# Patient Record
Sex: FEMALE | Race: WHITE | Employment: OTHER | URBAN - NONMETROPOLITAN AREA
[De-identification: names, ages, dates, MRNs, and addresses within clinical notes are randomized per-mention and may not be internally consistent; named-entity substitution may affect disease eponyms.]

---

## 2017-01-04 ENCOUNTER — TELEPHONE (OUTPATIENT)
Dept: FAMILY MEDICINE | Facility: OTHER | Age: 67
End: 2017-01-04

## 2018-06-04 ENCOUNTER — MYC MEDICAL ADVICE (OUTPATIENT)
Dept: INTERNAL MEDICINE | Facility: CLINIC | Age: 68
End: 2018-06-04

## 2018-06-04 DIAGNOSIS — Z12.11 SCREENING FOR COLON CANCER: Primary | ICD-10-CM

## 2018-06-04 DIAGNOSIS — M85.80 OSTEOPENIA: ICD-10-CM

## 2018-06-06 ENCOUNTER — TELEPHONE (OUTPATIENT)
Dept: INTERNAL MEDICINE | Facility: CLINIC | Age: 68
End: 2018-06-06

## 2018-06-06 NOTE — TELEPHONE ENCOUNTER
Left message for patient to return call to schedule EGD/colonoscopy. If Estefani or Chelle are not available, please transfer to same day surgery

## 2018-06-13 NOTE — TELEPHONE ENCOUNTER
Left message for patient to return call to schedule colonoscopy or EGD. If Chelle or Estefani are unavailable, please transfer to the surgery center.       Letter sent

## 2018-06-20 ENCOUNTER — OFFICE VISIT (OUTPATIENT)
Dept: INTERNAL MEDICINE | Facility: CLINIC | Age: 68
End: 2018-06-20
Payer: COMMERCIAL

## 2018-06-20 VITALS
WEIGHT: 137 LBS | RESPIRATION RATE: 16 BRPM | SYSTOLIC BLOOD PRESSURE: 116 MMHG | OXYGEN SATURATION: 100 % | HEIGHT: 64 IN | TEMPERATURE: 97.7 F | DIASTOLIC BLOOD PRESSURE: 74 MMHG | BODY MASS INDEX: 23.39 KG/M2 | HEART RATE: 94 BPM

## 2018-06-20 DIAGNOSIS — R09.82 POST-NASAL DRIP: ICD-10-CM

## 2018-06-20 DIAGNOSIS — M81.0 AGE-RELATED OSTEOPOROSIS WITHOUT CURRENT PATHOLOGICAL FRACTURE: ICD-10-CM

## 2018-06-20 DIAGNOSIS — Z12.11 SPECIAL SCREENING FOR MALIGNANT NEOPLASM OF COLON: ICD-10-CM

## 2018-06-20 DIAGNOSIS — E03.9 HYPOTHYROIDISM, UNSPECIFIED TYPE: Primary | ICD-10-CM

## 2018-06-20 LAB — TSH SERPL DL<=0.005 MIU/L-ACNC: 0.81 MU/L (ref 0.4–4)

## 2018-06-20 PROCEDURE — 99214 OFFICE O/P EST MOD 30 MIN: CPT | Performed by: INTERNAL MEDICINE

## 2018-06-20 PROCEDURE — 84443 ASSAY THYROID STIM HORMONE: CPT | Performed by: INTERNAL MEDICINE

## 2018-06-20 PROCEDURE — 36415 COLL VENOUS BLD VENIPUNCTURE: CPT | Performed by: INTERNAL MEDICINE

## 2018-06-20 ASSESSMENT — PAIN SCALES - GENERAL: PAINLEVEL: NO PAIN (0)

## 2018-06-20 NOTE — PROGRESS NOTES
"  SUBJECTIVE:   Marina Lo is a 67 year old female who presents to clinic today for the following health issues:    Chief Complaint   Patient presents with     [facility] Care     Thyroid  Osteopenia     Sinus Problem      Lives in [facility] and is here now, mother is a patient of mine.     Hypothyroidism and been on synthroid.     Osteoporosis and has been on fosamax for years and quit it 6 weeks ago, maybe feels better.     She is ok with colonoscopy.    Some post nasal drip, bad breath, sinus congestion on and off.      Past Medical History:   Diagnosis Date     Osteoporosis     was treated with fosamax.     Unspecified hypothyroidism 1983     Current Outpatient Prescriptions   Medication     CALCIUM 600 + D 600-200 MG-UNIT OR TABS     levothyroxine (SYNTHROID) 50 MCG tablet     MULTIVITAMIN TABS   OR     No current facility-administered medications for this visit.      Social History   Substance Use Topics     Smoking status: Never Smoker     Smokeless tobacco: Never Used     Alcohol use 1.0 oz/week      Comment: occasional     Review of Systems  Constitutional-No fevers, chills, or weight changes..  ENT-No earpain, sore throat, voice changes or rhinitis.  Cardiac-No chest pain or palpitations.  Respiratory-No cough, sob, or hemoptysis.  GI-some lower abd irritation, she is gluten free for many years..  Musculoskeletal-No muscles aches or joint pains.    Physical Exam  /74  Pulse 94  Temp 97.7  F (36.5  C) (Temporal)  Resp 16  Ht 5' 4\" (1.626 m)  Wt 137 lb (62.1 kg)  SpO2 100%  BMI 23.52 kg/m2  General Appearance-healthy, alert, no distress  ENT-ENT exam normal, no neck nodes or sinus tenderness  Cardiac-regular rate and rhythm  with normal S1, S2 ; no murmur, rub or gallops  Lungs-clear to auscultation  GI-Soft, nontender.  Normal bowel sounds.  No hepatosplenomegaly or abnormal masses  Extremities-no peripheral edema, peripheral pulses normal  Musculoskeletal-Back with normal ROM. Motor " strength intact.    ASSESSMENT:  This is a 67-year-old woman who is actually living in [facility] where her  is a professor.  She comes back twice a year.  Overall is feeling pretty good.  She does have a international insurance so has to check on some prices.    Need for a mammogram she defers and wants to wait to check on the price.    Need for colonoscopy she will look into this and will probably do it later on but she is to check the cost.    Osteoporosis-the patient has been on Fosamax.  I would recommend a DEXA scan to see how her bones are doing but she can also be on a 5 year holiday and she is already been on Fosamax for over 5 years.    Hypothyroidism-we will check her TSH and continue her on Synthroid.  She is on her on a lower dose recently.    Her lipids have been very good with a high HDL so no reason to check today.    Sinus congestion without any sinus pain, appears to be more chronic allergies or postnasal drip, will have her try over-the-counter Flonase nasal spray.    Electronically signed by Eddy Pendleton MD

## 2018-06-20 NOTE — TELEPHONE ENCOUNTER
Date of colonoscopy/EGD: 6/27/18  Surgeon: Dr. Pineda  Prep:Miralax  Packet:Colonoscopy instructions mailed to patient's home address. also advised that she can print from [employer] ([employer]).    Date: 6/20/2018      Surgery Scheduler

## 2018-06-20 NOTE — MR AVS SNAPSHOT
"              After Visit Summary   6/20/2018    Marina Lo    MRN: [id number]           Patient Information     Date Of Birth          1950        Visit Information        Provider Department      6/20/2018 7:00 AM Eddy Pendleton MD [facility]         Follow-ups after your visit        Who to contact     If you have questions or need follow up information about today's clinic visit or your schedule please contact [facility] directly at [phone].  Normal or non-critical lab and imaging results will be communicated to you by MyChart, letter or phone within 4 business days after the clinic has received the results. If you do not hear from us within 7 days, please contact the clinic through [employer] or phone. If you have a critical or abnormal lab result, we will notify you by phone as soon as possible.  Submit refill requests through [employer] or call your pharmacy and they will forward the refill request to us. Please allow 3 business days for your refill to be completed.          Additional Information About Your Visit        MyChart Information     [employer] gives you secure access to your electronic health record. If you see a primary care provider, you can also send messages to your care team and make appointments. If you have questions, please call your primary care clinic.  If you do not have a primary care provider, please call [phone] and they will assist you.        Care EveryWhere ID     This is your Care EveryWhere ID. This could be used by other organizations to access your [place] medical records  [id number]        Your Vitals Were     Pulse Temperature Respirations Height Pulse Oximetry BMI (Body Mass Index)    94 97.7  F (36.5  C) (Temporal) 16 5' 4\" (1.626 m) 100% 23.52 kg/m2       Blood Pressure from Last 3 Encounters:   06/20/18 116/74   07/27/16 102/66   08/05/13 106/62    Weight from Last 3 Encounters:   06/20/18 137 lb (62.1 kg)   07/27/16 140 lb " 1.6 oz (63.5 kg)   08/05/13 125 lb 4.8 oz (56.8 kg)              Today, you had the following     No orders found for display       Primary Care Provider Office Phone # Fax #    Eddy Pendleton [phone] [address] [address]        Equal Access to Services     JOANN WEBER : Hadii aad ku hadasho Soomaali, waaxda luqadaha, qaybta kaalmada adeegyada, waxay idiin hayaan adeeg khharindersh laflorencion . So [facility] [phone].    ATENCIÓN: Si habla español, tiene a hanna disposición servicios gratuitos de asistencia lingüística. Llame al [phone].    We comply with applicable federal civil rights laws and [place] laws. We do not discriminate on the basis of race, color, national origin, age, disability, sex, sexual orientation, or gender identity.            Thank you!     Thank you for choosing [facility]  for your care. Our goal is always to provide you with excellent care. Hearing back from our patients is one way we can continue to improve our services. Please take a few minutes to complete the written survey that you may receive in the mail after your visit with us. Thank you!             Your Updated Medication List - Protect others around you: Learn how to safely use, store and throw away your medicines at www.disposemymeds.org.          This list is accurate as of 6/20/18  7:05 AM.  Always use your most recent med list.                   Brand Name Dispense Instructions for use Diagnosis    CALCIUM 600 + D 600-200 MG-UNIT Tabs     200    3 daily        levothyroxine 50 MCG tablet    SYNTHROID    280 tablet    Take 1 tablet (50 mcg) by mouth every morning    Hypothyroidism, unspecified type       MULTIVITAMIN TABS   OR      1 tab daily

## 2018-06-21 ENCOUNTER — HOSPITAL ENCOUNTER (OUTPATIENT)
Dept: BONE DENSITY | Facility: CLINIC | Age: 68
Discharge: HOME | End: 2018-06-21
Attending: INTERNAL MEDICINE | Admitting: INTERNAL MEDICINE
Payer: COMMERCIAL

## 2018-06-21 PROCEDURE — 77080 DXA BONE DENSITY AXIAL: CPT

## 2018-06-27 ENCOUNTER — SURGERY (OUTPATIENT)
Age: 68
End: 2018-06-27

## 2018-06-27 ENCOUNTER — HOSPITAL ENCOUNTER (OUTPATIENT)
Facility: CLINIC | Age: 68
Discharge: HOME | End: 2018-06-27
Attending: SURGERY | Admitting: SURGERY
Payer: COMMERCIAL

## 2018-06-27 VITALS
TEMPERATURE: 97.9 F | SYSTOLIC BLOOD PRESSURE: 138 MMHG | RESPIRATION RATE: 12 BRPM | OXYGEN SATURATION: 98 % | DIASTOLIC BLOOD PRESSURE: 88 MMHG

## 2018-06-27 LAB — COLONOSCOPY: NORMAL

## 2018-06-27 PROCEDURE — G0500 MOD SEDAT ENDO SERVICE >5YRS: HCPCS | Performed by: SURGERY

## 2018-06-27 PROCEDURE — 45378 DIAGNOSTIC COLONOSCOPY: CPT | Performed by: SURGERY

## 2018-06-27 PROCEDURE — G0121 COLON CA SCRN NOT HI RSK IND: HCPCS | Performed by: SURGERY

## 2018-06-27 PROCEDURE — 25000128 H RX IP 250 OP 636: Performed by: SURGERY

## 2018-06-27 PROCEDURE — 25000132 ZZH RX MED GY IP 250 OP 250 PS 637: Performed by: SURGERY

## 2018-06-27 PROCEDURE — G0500 MOD SEDAT ENDO SERVICE >5YRS: HCPCS

## 2018-06-27 PROCEDURE — 40000297 ZZH STATISTIC ENDO RECOVERY CLASS 1:2 EACH ADDL HOUR: Performed by: SURGERY

## 2018-06-27 PROCEDURE — 40000296 ZZH STATISTIC ENDO RECOVERY CLASS 1:2 FIRST HOUR: Performed by: SURGERY

## 2018-06-27 RX ORDER — FENTANYL CITRATE 50 UG/ML
INJECTION, SOLUTION INTRAMUSCULAR; INTRAVENOUS PRN
Status: DISCONTINUED | OUTPATIENT
Start: 2018-06-27 | End: 2018-06-27 | Stop reason: HOSPADM

## 2018-06-27 RX ORDER — LIDOCAINE 40 MG/G
CREAM TOPICAL
Status: DISCONTINUED | OUTPATIENT
Start: 2018-06-27 | End: 2018-06-27 | Stop reason: HOSPADM

## 2018-06-27 RX ORDER — ONDANSETRON 2 MG/ML
4 INJECTION INTRAMUSCULAR; INTRAVENOUS
Status: DISCONTINUED | OUTPATIENT
Start: 2018-06-27 | End: 2018-06-27 | Stop reason: HOSPADM

## 2018-06-27 RX ADMIN — MIDAZOLAM 2 MG: 1 INJECTION INTRAMUSCULAR; INTRAVENOUS at 07:39

## 2018-06-27 RX ADMIN — FENTANYL CITRATE 25 MCG: 50 INJECTION, SOLUTION INTRAMUSCULAR; INTRAVENOUS at 07:58

## 2018-06-27 RX ADMIN — MIDAZOLAM 1 MG: 1 INJECTION INTRAMUSCULAR; INTRAVENOUS at 07:44

## 2018-06-27 RX ADMIN — FENTANYL CITRATE 25 MCG: 50 INJECTION, SOLUTION INTRAMUSCULAR; INTRAVENOUS at 07:44

## 2018-06-27 RX ADMIN — SIMETHICONE 250 ML: 20 SUSPENSION/ DROPS ORAL at 07:46

## 2018-06-27 RX ADMIN — FENTANYL CITRATE 100 MCG: 50 INJECTION, SOLUTION INTRAMUSCULAR; INTRAVENOUS at 07:39

## 2018-06-27 NOTE — IP AVS SNAPSHOT
MRN:[id number]                      After Visit Summary   6/27/2018    Marina Lo    MRN: [id number]           Thank you!     Thank you for choosing [place] for your care. Our goal is always to provide you with excellent care. Hearing back from our patients is one way we can continue to improve our services. Please take a few minutes to complete the written survey that you may receive in the mail after you visit with us. Thank you!        Patient Information     Date Of Birth          1950        About your hospital stay     You were admitted on:  June 27, 2018 You last received care in the:  [facility]    You were discharged on:  June 27, 2018       Who to Call     For medical emergencies, please call 911.  For non-urgent questions about your medical care, please call your primary care provider or clinic, [phone]  For questions related to your surgery, please call your surgery clinic        Attending Provider     Provider Specialty    Rishi Ayers,  Surgery       Primary Care Provider Office Phone # Fax #    Eddy Pendleton [phone]      Further instructions from your care team         [facility]    Home Care Following Endoscopy          Activity:    You have just undergone an endoscopic procedure usually performed with conscious sedation.  Do not work or operate machinery (including a car) for at least 12 hours.      I encourage you to walk and attempt to pass this air as soon as possible.    Diet:    Return to the diet you were on before your procedure but eat lightly for the first 12-24 hours.    Drink plenty of water.    Resume any regular medications unless otherwise advised by your physician.  Please begin any new medication prescribed as a result of your procedure as directed by your physician.     If you had any biopsy or polyp removed please refrain from aspirin or aspirin products for 2 days.  If on  Coumadin please restart as instructed by your physician.   Pain:    You may take Tylenol as needed for pain.  Expected Recovery:    You can expect some mild abdominal fullness and/or discomfort due to the air used to inflate your intestinal tract. It is also normal to have a mild sore throat after upper endoscopy.    Call Your Physician if You Have    After Colonoscopy:  o Worsening persisting abdominal pain which is worse with activity.  o Fevers (>101 degrees F), chills or shakes.  o Passage of continued blood with bowel movements.   Any questions or concerns about your recovery, please call [phone] or after hours [phone] Nurse Advice Line.    Follow-up Care:    You should receive a call or letter with your results within 1 week. Please call if you have not received a notification of your results.  If asked to return to clinic please make an appointment 1 week after your procedure.  Call [phone].     Same-Day Surgery   Adult Discharge Orders & Instructions     For 24 hours after surgery    1. Get plenty of rest.  A responsible adult must stay with you for at least 24 hours after you leave the hospital.   2. Do not drive or use heavy equipment.  If you have weakness or tingling, don't drive or use heavy equipment until this feeling goes away.  3. Do not drink alcohol.  4. Avoid strenuous or risky activities.  Ask for help when climbing stairs.   5. You may feel lightheaded.  IF so, sit for a few minutes before standing.  Have someone help you get up.   6. You may have a slight fever. Call the doctor if your fever is over 100 F (37.7 C) (taken under the tongue) or lasts longer than 24 hours.  7. You may have a dry mouth, a sore throat, muscle aches or trouble sleeping.  These should go away after 24 hours.  8. Do not make important or legal decisions.  Based on the surgery/procedure that you had today, we do not anticipate that you will have any problems.  However, given the various responses that  patients can have to the surgical experience, we want to ensure that you have information available to manage pain or nausea and what to do if you observe bleeding or you develop any signs and symptoms of infection:  Methods to control pain include:  Prescription pain medication or over the counter medications as prescribed or suggested by your physician.  In addition, ice packs and periods of rest are often helpful.  If your pain is not managed with the above methods, contact your physician.  Methods to control nausea include:  Anti-nausea medication approved by your physician.  Drink clear liquids such as apple juice, ginger ale, broth or 7-Up. Be sure to drink enough fluids.  Move to a regular diet as you feel able.  Rest may also help.  Bleeding:  It's not uncommon to see a little blood staining on the dressing, about the size of a quarter in the first 24 hours; if you see this, there is no reason to be alarmed.  However, should this continue to increase in size, apply pressure if able, ant notify your physician.  Infection:  We do not anticipate that you will acquire an infection, but if you should experience any of the following symptoms:  redness, swelling, heat, increasing pain or abnormal drainage at your surgery site, fever or chills, please notify your physician.    Call your doctor for any of the followin.  Signs of infection (fever, growing tenderness at the surgery site, a large amount of drainage or bleeding, severe pain, foul-smelling drainage, redness, swelling).    2. It has been over 8 to 10 hours since surgery and you are still not able to urinate (pass water).    Nurse advice line: [phone]        Pending Results     No orders found from 2018 to 2018.            Admission Information     Date & Time Provider Department Dept. Phone    2018 Rishi Ayers,  [facility] Endoscopy [phone]      Your Vitals Were     Blood Pressure Temperature  Respirations Pulse Oximetry          138/88 97.9  F (36.6  C) (Oral) 12 98%        [employer] Information     [employer] gives you secure access to your electronic health record. If you see a primary care provider, you can also send messages to your care team and make appointments. If you have questions, please call your primary care clinic.  If you do not have a primary care provider, please call [phone] and they will assist you.        Care EveryWhere ID     This is your Care EveryWhere ID. This could be used by other organizations to access your [place] medical records  [id number]        Equal Access to Services     [facility]: Anthony Morfin, mary marie, mel rangel, jessie mauro . So [facility] [phone].    ATENCIÓN: Si habla español, tiene a hanna disposición servicios gratuitos de asistencia lingüística. Radha al [phone].    We comply with applicable federal civil rights laws and [place] laws. We do not discriminate on the basis of race, color, national origin, age, disability, sex, sexual orientation, or gender identity.               Review of your medicines      UNREVIEWED medicines. Ask your doctor about these medicines        Dose / Directions    CALCIUM 600 + D 600-200 MG-UNIT Tabs        3 daily   Quantity:  200   Refills:  3       levothyroxine 50 MCG tablet   Commonly known as:  SYNTHROID   Used for:  Hypothyroidism, unspecified type        Dose:  50 mcg   Take 1 tablet (50 mcg) by mouth every morning   Quantity:  280 tablet   Refills:  1       MULTIVITAMIN TABS   OR        1 tab daily   Refills:  0                Protect others around you: Learn how to safely use, store and throw away your medicines at www.disposemymeds.org.             Medication List: This is a list of all your medications and when to take them. Check marks below indicate your daily home schedule. Keep this list as a reference.      Medications           Morning  Afternoon Evening Bedtime As Needed    CALCIUM 600 + D 600-200 MG-UNIT Tabs   3 daily                                levothyroxine 50 MCG tablet   Commonly known as:  SYNTHROID   Take 1 tablet (50 mcg) by mouth every morning                                MULTIVITAMIN TABS   OR   1 tab daily

## 2018-06-27 NOTE — IP AVS SNAPSHOT
[facility]    [address] [address]    Phone:  [phone]                                       After Visit Summary   6/27/2018    Marina Lo    MRN: [id number]           After Visit Summary Signature Page     I have received my discharge instructions, and my questions have been answered. I have discussed any challenges I see with this plan with the nurse or doctor.    ..........................................................................................................................................  Patient/Patient Representative Signature      ..........................................................................................................................................  Patient Representative Print Name and Relationship to Patient    ..................................................               ................................................  Date                                            Time    ..........................................................................................................................................  Reviewed by Signature/Title    ...................................................              ..............................................  Date                                                            Time

## 2018-06-27 NOTE — DISCHARGE INSTRUCTIONS
[facility]    Home Care Following Endoscopy          Activity:    You have just undergone an endoscopic procedure usually performed with conscious sedation.  Do not work or operate machinery (including a car) for at least 12 hours.      I encourage you to walk and attempt to pass this air as soon as possible.    Diet:    Return to the diet you were on before your procedure but eat lightly for the first 12-24 hours.    Drink plenty of water.    Resume any regular medications unless otherwise advised by your physician.  Please begin any new medication prescribed as a result of your procedure as directed by your physician.     If you had any biopsy or polyp removed please refrain from aspirin or aspirin products for 2 days.  If on Coumadin please restart as instructed by your physician.   Pain:    You may take Tylenol as needed for pain.  Expected Recovery:    You can expect some mild abdominal fullness and/or discomfort due to the air used to inflate your intestinal tract. It is also normal to have a mild sore throat after upper endoscopy.    Call Your Physician if You Have    After Colonoscopy:  o Worsening persisting abdominal pain which is worse with activity.  o Fevers (>101 degrees F), chills or shakes.  o Passage of continued blood with bowel movements.   Any questions or concerns about your recovery, please call [phone] or after hours [phone] Nurse Advice Line.    Follow-up Care:    You should receive a call or letter with your results within 1 week. Please call if you have not received a notification of your results.  If asked to return to clinic please make an appointment 1 week after your procedure.  Call [phone].     Same-Day Surgery   Adult Discharge Orders & Instructions     For 24 hours after surgery    1. Get plenty of rest.  A responsible adult must stay with you for at least 24 hours after you leave the hospital.   2. Do not drive or use heavy equipment.  If you have  weakness or tingling, don't drive or use heavy equipment until this feeling goes away.  3. Do not drink alcohol.  4. Avoid strenuous or risky activities.  Ask for help when climbing stairs.   5. You may feel lightheaded.  IF so, sit for a few minutes before standing.  Have someone help you get up.   6. You may have a slight fever. Call the doctor if your fever is over 100 F (37.7 C) (taken under the tongue) or lasts longer than 24 hours.  7. You may have a dry mouth, a sore throat, muscle aches or trouble sleeping.  These should go away after 24 hours.  8. Do not make important or legal decisions.  Based on the surgery/procedure that you had today, we do not anticipate that you will have any problems.  However, given the various responses that patients can have to the surgical experience, we want to ensure that you have information available to manage pain or nausea and what to do if you observe bleeding or you develop any signs and symptoms of infection:  Methods to control pain include:  Prescription pain medication or over the counter medications as prescribed or suggested by your physician.  In addition, ice packs and periods of rest are often helpful.  If your pain is not managed with the above methods, contact your physician.  Methods to control nausea include:  Anti-nausea medication approved by your physician.  Drink clear liquids such as apple juice, ginger ale, broth or 7-Up. Be sure to drink enough fluids.  Move to a regular diet as you feel able.  Rest may also help.  Bleeding:  It's not uncommon to see a little blood staining on the dressing, about the size of a quarter in the first 24 hours; if you see this, there is no reason to be alarmed.  However, should this continue to increase in size, apply pressure if able, ant notify your physician.  Infection:  We do not anticipate that you will acquire an infection, but if you should experience any of the following symptoms:  redness, swelling, heat,  increasing pain or abnormal drainage at your surgery site, fever or chills, please notify your physician.    Call your doctor for any of the followin.  Signs of infection (fever, growing tenderness at the surgery site, a large amount of drainage or bleeding, severe pain, foul-smelling drainage, redness, swelling).    2. It has been over 8 to 10 hours since surgery and you are still not able to urinate (pass water).    Nurse advice line: [phone]

## 2019-09-18 ENCOUNTER — MYC MEDICAL ADVICE (OUTPATIENT)
Dept: INTERNAL MEDICINE | Facility: CLINIC | Age: 69
End: 2019-09-18

## 2019-09-18 NOTE — TELEPHONE ENCOUNTER
Only the provider can release these records to her. Do we tell her they were completely normal or I can copy and paste the exam and send it to her with a Dr's ok?

## 2019-09-27 ENCOUNTER — HEALTH MAINTENANCE LETTER (OUTPATIENT)
Age: 69
End: 2019-09-27

## 2020-03-15 ENCOUNTER — HEALTH MAINTENANCE LETTER (OUTPATIENT)
Age: 70
End: 2020-03-15

## 2021-01-09 ENCOUNTER — HEALTH MAINTENANCE LETTER (OUTPATIENT)
Age: 71
End: 2021-01-09

## 2021-05-08 ENCOUNTER — HEALTH MAINTENANCE LETTER (OUTPATIENT)
Age: 71
End: 2021-05-08

## 2021-10-23 ENCOUNTER — HEALTH MAINTENANCE LETTER (OUTPATIENT)
Age: 71
End: 2021-10-23

## 2022-06-04 ENCOUNTER — HEALTH MAINTENANCE LETTER (OUTPATIENT)
Age: 72
End: 2022-06-04

## 2022-10-09 ENCOUNTER — HEALTH MAINTENANCE LETTER (OUTPATIENT)
Age: 72
End: 2022-10-09

## 2023-06-10 ENCOUNTER — HEALTH MAINTENANCE LETTER (OUTPATIENT)
Age: 73
End: 2023-06-10

## 2023-10-28 ENCOUNTER — HEALTH MAINTENANCE LETTER (OUTPATIENT)
Age: 73
End: 2023-10-28

## (undated) RX ORDER — LIDOCAINE HYDROCHLORIDE 10 MG/ML
INJECTION, SOLUTION EPIDURAL; INFILTRATION; INTRACAUDAL; PERINEURAL
Status: DISPENSED
Start: 2018-06-27

## (undated) RX ORDER — FENTANYL CITRATE 50 UG/ML
INJECTION, SOLUTION INTRAMUSCULAR; INTRAVENOUS
Status: DISPENSED
Start: 2018-06-27